# Patient Record
Sex: FEMALE | URBAN - METROPOLITAN AREA
[De-identification: names, ages, dates, MRNs, and addresses within clinical notes are randomized per-mention and may not be internally consistent; named-entity substitution may affect disease eponyms.]

---

## 2021-01-04 ENCOUNTER — IMPORTED ENCOUNTER (OUTPATIENT)
Dept: URBAN - METROPOLITAN AREA CLINIC 1 | Facility: CLINIC | Age: 46
End: 2021-01-04

## 2021-01-04 PROBLEM — H52.13: Noted: 2021-01-04

## 2021-01-04 PROBLEM — H52.4: Noted: 2021-01-04

## 2021-01-04 PROBLEM — H52.223: Noted: 2021-01-04

## 2021-01-04 PROCEDURE — 92004 COMPRE OPH EXAM NEW PT 1/>: CPT

## 2021-01-04 PROCEDURE — 92015 DETERMINE REFRACTIVE STATE: CPT

## 2021-01-04 NOTE — PATIENT DISCUSSION
1. Myopia w/ Astigmatism OU -- Rx was given for correction if indicated and requested. 2. Presbyopia CTL Trials to be ordered by [facility] (My Day [facility]/My Day). Return for an appointment in 1-2 week cc with Dr. Annie Gilliam.

## 2021-01-22 ENCOUNTER — IMPORTED ENCOUNTER (OUTPATIENT)
Dept: URBAN - METROPOLITAN AREA CLINIC 1 | Facility: CLINIC | Age: 46
End: 2021-01-22

## 2021-01-22 NOTE — PATIENT DISCUSSION
1. CC Today -- No changes made to [facility] (OD - Distance/OS - Near). Patient to finish out trials and adapt more to lenses secondary to having them in the wrong eyes. Return for an appointment in 1 week cc with Dr. Joann Benson.

## 2021-06-18 ENCOUNTER — IMPORTED ENCOUNTER (OUTPATIENT)
Dept: URBAN - METROPOLITAN AREA CLINIC 1 | Facility: CLINIC | Age: 46
End: 2021-06-18

## 2021-06-18 PROBLEM — H04.123: Noted: 2021-06-18

## 2021-06-18 PROBLEM — H16.143: Noted: 2021-06-18

## 2021-06-18 PROCEDURE — 92014 COMPRE OPH EXAM EST PT 1/>: CPT

## 2021-06-18 NOTE — PATIENT DISCUSSION
1. HARIS w/ PEK OU -- Recommend ATs TID OU routinely. (Sample of Blink given). Could use OTC Lumify for redness PRN. Patient deferred Manifest Rx today. (returns under vision plan)Return for an appointment in January 40 with Dr. Richard Walden. Return for an appointment in 1 year 27 with Dr. Richard Walden.

## 2022-04-02 ASSESSMENT — TONOMETRY
OD_IOP_MMHG: 16
OS_IOP_MMHG: 16
OD_IOP_MMHG: 16
OS_IOP_MMHG: 16

## 2022-04-02 ASSESSMENT — KERATOMETRY
OS_K2POWER_DIOPTERS: 42.75
OD_K1POWER_DIOPTERS: 44.25
OS_AXISANGLE_DEGREES: 101
OS_AXISANGLE2_DEGREES: 011
OD_K2POWER_DIOPTERS: 42.75
OD_AXISANGLE_DEGREES: 073
OD_AXISANGLE2_DEGREES: 163
OS_K1POWER_DIOPTERS: 44.25

## 2022-04-02 ASSESSMENT — VISUAL ACUITY
OS_CC: 20/20
OD_SC: 20/20
OD_SC: J2
OS_SC: 20/30
OS_CC: J1
OS_SC: J2
OS_CC: 20/30
OD_CC: J1
OS_CC: 20/25
OD_CC: 20/20
OD_CC: 20/20
OU_SC: 20/20-2
OS_SC: 20/25
OU_CC: 20/30

## 2023-02-01 RX ORDER — MAGNESIUM OXIDE 400 MG/1
400 TABLET ORAL DAILY
COMMUNITY

## 2023-02-01 RX ORDER — MULTIVIT WITH MINERALS/LUTEIN
1000 TABLET ORAL DAILY
COMMUNITY